# Patient Record
Sex: FEMALE | Race: WHITE | NOT HISPANIC OR LATINO | Employment: UNEMPLOYED | URBAN - METROPOLITAN AREA
[De-identification: names, ages, dates, MRNs, and addresses within clinical notes are randomized per-mention and may not be internally consistent; named-entity substitution may affect disease eponyms.]

---

## 2019-09-25 PROBLEM — L40.50 PSA (PSORIATIC ARTHRITIS): Status: ACTIVE | Noted: 2019-09-25

## 2019-09-25 PROBLEM — I10 ESSENTIAL HYPERTENSION: Status: ACTIVE | Noted: 2019-09-25

## 2019-10-01 ENCOUNTER — TELEPHONE (OUTPATIENT)
Dept: PHARMACY | Facility: CLINIC | Age: 54
End: 2019-10-01

## 2019-10-17 ENCOUNTER — TELEPHONE (OUTPATIENT)
Dept: PHARMACY | Facility: CLINIC | Age: 54
End: 2019-10-17

## 2019-10-17 NOTE — TELEPHONE ENCOUNTER
LVM for callback to inform patient that [facility] received prescription for Cosentyx and prior authorization is required.  [facility] will be back in touch once insurance determination is received.

## 2019-10-29 NOTE — TELEPHONE ENCOUNTER
DOCUMENTATION ONLY:  Prior authorization for Cosentyx approved from 10/28/2019 to 1/21/2020.  Case ID# [id number]    Co-pay: $0    Patient Assistance IS NOT required.     Forward to clinical pharmacist for consult & shipment.

## 2019-10-30 PROBLEM — F41.9 ANXIETY: Status: ACTIVE | Noted: 2019-10-30

## 2019-10-30 PROBLEM — G47.33 OSA (OBSTRUCTIVE SLEEP APNEA): Status: ACTIVE | Noted: 2019-10-30

## 2019-11-04 ENCOUNTER — TELEPHONE (OUTPATIENT)
Dept: PHARMACY | Facility: CLINIC | Age: 54
End: 2019-11-04

## 2019-12-04 ENCOUNTER — TELEPHONE (OUTPATIENT)
Dept: SURGERY | Facility: HOSPITAL | Age: 54
End: 2019-12-04

## 2019-12-12 ENCOUNTER — TELEPHONE (OUTPATIENT)
Dept: PHARMACY | Facility: CLINIC | Age: 54
End: 2019-12-12

## 2019-12-12 NOTE — TELEPHONE ENCOUNTER
LVM 12/12 for Cosentyx consult/delivery - continuation of therapy.  Will review if patient would like to fill with [facility].

## 2019-12-20 NOTE — TELEPHONE ENCOUNTER
4th call attempted. LVM 12/12 for Cosentyx consult/delivery - continuation of therapy.  Will review if patient would like to fill with [facility], patient last filled medication at [facility] on 12/5 RTS until 12/29. Sending postcard and notifying proivder.

## 2020-01-21 NOTE — TELEPHONE ENCOUNTER
DOCUMENTATION ONLY:     Prior authorization for Cosentyx approved from 1/17/2020 to 3/20/2020    Case ID# [id number]    Co-pay: $0.00    Patient Assistance IS NOT required.     Forward to Clinical Pharmacist. -HBR

## 2020-03-06 NOTE — TELEPHONE ENCOUNTER
Called to initiate PA, Rep stated an active PA is on file 2/11/2020-2/10/2021, PA#[id number]. -[facility]BR

## 2020-06-22 ENCOUNTER — TELEPHONE (OUTPATIENT)
Dept: PHARMACY | Facility: CLINIC | Age: 55
End: 2020-06-22

## 2020-06-23 NOTE — TELEPHONE ENCOUNTER
Call attempt 1 Cosentyx for PsA Initial assessment and schedule delivery. LVM and sent [employer] message 6/23. ($0 copay in 004)

## 2020-06-25 ENCOUNTER — TELEPHONE (OUTPATIENT)
Dept: PHARMACY | Facility: CLINIC | Age: 55
End: 2020-06-25

## 2020-06-29 NOTE — TELEPHONE ENCOUNTER
LVM with patient on 6/29 for Cosentyx initial consult/fill (continuation). $0 copay (004). Sent [employer].

## 2020-10-02 ENCOUNTER — TELEPHONE (OUTPATIENT)
Dept: PHARMACY | Facility: CLINIC | Age: 55
End: 2020-10-02

## 2020-10-02 NOTE — TELEPHONE ENCOUNTER
LVM for callback to inform patient that [facility] received prescription for Cosentyx and benefits investigation is required.  [facility] will be back in touch once insurance determination is received.

## 2020-10-09 ENCOUNTER — PATIENT MESSAGE (OUTPATIENT)
Dept: PHARMACY | Facility: CLINIC | Age: 55
End: 2020-10-09

## 2020-11-22 PROBLEM — H04.123 DRY EYE SYNDROME OF LACRIMAL GLAND, BILATERAL: Status: ACTIVE | Noted: 2020-11-22

## 2020-11-22 PROBLEM — H25.13 AGE-RELATED NUCLEAR CATARACT OF BOTH EYES: Status: ACTIVE | Noted: 2020-11-22

## 2021-01-07 ENCOUNTER — SPECIALTY PHARMACY (OUTPATIENT)
Dept: PHARMACY | Facility: CLINIC | Age: 56
End: 2021-01-07

## 2021-01-29 ENCOUNTER — PATIENT MESSAGE (OUTPATIENT)
Dept: PHARMACY | Facility: CLINIC | Age: 56
End: 2021-01-29

## 2021-02-04 ENCOUNTER — SPECIALTY PHARMACY (OUTPATIENT)
Dept: PHARMACY | Facility: CLINIC | Age: 56
End: 2021-02-04

## 2021-02-05 ENCOUNTER — SPECIALTY PHARMACY (OUTPATIENT)
Dept: PHARMACY | Facility: CLINIC | Age: 56
End: 2021-02-05

## 2021-03-03 ENCOUNTER — PATIENT MESSAGE (OUTPATIENT)
Dept: PHARMACY | Facility: CLINIC | Age: 56
End: 2021-03-03

## 2021-09-29 ENCOUNTER — SPECIALTY PHARMACY (OUTPATIENT)
Dept: PHARMACY | Facility: CLINIC | Age: 56
End: 2021-09-29

## 2021-10-04 ENCOUNTER — TELEPHONE (OUTPATIENT)
Dept: PHARMACY | Facility: CLINIC | Age: 56
End: 2021-10-04